# Patient Record
Sex: MALE | Race: BLACK OR AFRICAN AMERICAN | NOT HISPANIC OR LATINO | Employment: OTHER | URBAN - METROPOLITAN AREA
[De-identification: names, ages, dates, MRNs, and addresses within clinical notes are randomized per-mention and may not be internally consistent; named-entity substitution may affect disease eponyms.]

---

## 2020-10-29 ENCOUNTER — HOSPITAL ENCOUNTER (OUTPATIENT)
Dept: SLEEP MEDICINE | Facility: HOSPITAL | Age: 35
Discharge: HOME | End: 2020-10-29
Attending: INTERNAL MEDICINE

## 2020-11-17 ENCOUNTER — HOSPITAL ENCOUNTER (OUTPATIENT)
Dept: SLEEP MEDICINE | Facility: HOSPITAL | Age: 35
Discharge: HOME | End: 2020-11-17
Attending: INTERNAL MEDICINE

## 2020-11-22 ENCOUNTER — HOSPITAL ENCOUNTER (OUTPATIENT)
Dept: MRI IMAGING | Facility: HOSPITAL | Age: 35
Discharge: HOME | End: 2020-11-22
Attending: FAMILY MEDICINE

## 2021-01-14 ENCOUNTER — HOSPITAL ENCOUNTER (OUTPATIENT)
Dept: SLEEP MEDICINE | Facility: HOSPITAL | Age: 36
Discharge: HOME | End: 2021-01-14
Attending: INTERNAL MEDICINE

## 2021-04-08 ENCOUNTER — HOSPITAL ENCOUNTER (OUTPATIENT)
Dept: SLEEP MEDICINE | Facility: HOSPITAL | Age: 36
Discharge: HOME | End: 2021-04-08
Attending: INTERNAL MEDICINE

## 2021-12-15 ENCOUNTER — APPOINTMENT (OUTPATIENT)
Dept: GENERAL RADIOLOGY | Facility: HOSPITAL | Age: 36
End: 2021-12-15

## 2021-12-15 ENCOUNTER — HOSPITAL ENCOUNTER (EMERGENCY)
Facility: HOSPITAL | Age: 36
Discharge: HOME | End: 2021-12-15
Attending: EMERGENCY MEDICINE | Admitting: EMERGENCY MEDICINE

## 2021-12-15 VITALS
BODY MASS INDEX: 26.54 KG/M2 | SYSTOLIC BLOOD PRESSURE: 129 MMHG | WEIGHT: 185.41 LBS | HEART RATE: 81 BPM | HEIGHT: 70 IN | RESPIRATION RATE: 18 BRPM | DIASTOLIC BLOOD PRESSURE: 90 MMHG | TEMPERATURE: 98.6 F | OXYGEN SATURATION: 100 %

## 2021-12-15 DIAGNOSIS — S83.92XA SPRAIN OF LEFT KNEE, UNSPECIFIED LIGAMENT, INITIAL ENCOUNTER: Primary | ICD-10-CM

## 2021-12-15 PROCEDURE — 99282 EMERGENCY DEPT VISIT SF MDM: CPT

## 2021-12-15 PROCEDURE — 73562 X-RAY EXAM OF KNEE 3: CPT

## 2021-12-15 NOTE — DISCHARGE INSTRUCTIONS
Please call Dr. Sage's office today or tomorrow and schedule follow-up appointment if needed or follow-up with your primary care provider for a referral for a MRI.  Turn to the ER immediately if you develop worsening of pain, numbness and tingling to extremity, or become unable to ambulate.  Please adhere to the RICE acronym for your pain and discomfort.

## 2021-12-15 NOTE — ED PROVIDER NOTES
Subjective   Patient is a 36-year-old male that presents to emergency department today complaining of left knee pain.  Patient states his knee started bothering him approximately 1 year ago and he has just been putting it off.  States he has no pain when he runs but when he walks he develops a pain on the outer side of his left knee.  He states it feels like his knee shifts inward.  When he sits down to rest his pain improves his pain at this current time is a 3 on a scale of 0-10.  No obvious deformity, no uncontrolled external hemorrhage, and no discoloration.  MS intact and cap refill within normal limits      History provided by:  Patient   used: No        Review of Systems   Constitutional: Negative for chills and fever.   HENT: Negative for congestion, ear pain and sore throat.    Eyes: Negative for pain.   Respiratory: Negative for cough, chest tightness and shortness of breath.    Cardiovascular: Negative for chest pain.   Gastrointestinal: Negative for abdominal pain, diarrhea, nausea and vomiting.   Genitourinary: Negative for flank pain and hematuria.   Musculoskeletal: Negative for joint swelling.        Left knee pain   Skin: Negative for pallor.   Neurological: Negative for seizures and headaches.   All other systems reviewed and are negative.      History reviewed. No pertinent past medical history.    No Known Allergies    Past Surgical History:   Procedure Laterality Date   • FACIAL RECONSTRUCTION SURGERY         History reviewed. No pertinent family history.    Social History     Socioeconomic History   • Marital status:    Tobacco Use   • Smoking status: Current Every Day Smoker   • Smokeless tobacco: Never Used   Substance and Sexual Activity   • Alcohol use: Yes     Comment: Social   • Drug use: Never           Objective   Physical Exam  Vitals and nursing note reviewed.   Constitutional:       General: He is not in acute distress.     Appearance: Normal appearance. He  is not toxic-appearing.   HENT:      Head: Normocephalic and atraumatic.      Mouth/Throat:      Mouth: Mucous membranes are moist.   Eyes:      General: No scleral icterus.  Cardiovascular:      Rate and Rhythm: Normal rate and regular rhythm.      Pulses: Normal pulses.      Heart sounds: Normal heart sounds.   Pulmonary:      Effort: Pulmonary effort is normal. No respiratory distress.      Breath sounds: Normal breath sounds.   Abdominal:      General: Abdomen is flat.      Palpations: Abdomen is soft.      Tenderness: There is no abdominal tenderness.   Musculoskeletal:         General: Tenderness present. Normal range of motion.      Cervical back: Normal range of motion and neck supple.      Comments: Left knee   Skin:     General: Skin is warm and dry.   Neurological:      Mental Status: He is alert and oriented to person, place, and time. Mental status is at baseline.         Procedures           ED Course                                                 MDM  Number of Diagnoses or Management Options  Sprain of left knee, unspecified ligament, initial encounter: minor  Diagnosis management comments: 36-year-old male presents the emergency department with left knee pain that has been intermittent for approximately 1 year.  Patient states pain only occurs when he is walking.  Running does not affect his knee.  He has no obvious injury and reports no obvious injury.  X-ray was completed to rule out any fracture, dislocation, or emergent situation that might require immediate surgical intervention or splinting.  X-ray was benign PMS and cap refill within normal limits to that extremity.  Patient was provided referral to orthopedic surgeon for follow-up and possible MRI to rule out any ligament and tendon injury.       Amount and/or Complexity of Data Reviewed  Tests in the radiology section of CPT®: reviewed  Review and summarize past medical records: yes (I have personally reviewed patient's previous medical  encounters.  )    Risk of Complications, Morbidity, and/or Mortality  Presenting problems: low  Diagnostic procedures: low  Management options: low    Patient Progress  Patient progress: stable      Final diagnoses:   Sprain of left knee, unspecified ligament, initial encounter       ED Disposition  ED Disposition     ED Disposition Condition Comment    Discharge Stable           Harleen Sage MD  [address]   [place] [address]  [phone]    In 1 week  As needed, If symptoms worsen         Medication List      New Prescriptions    diclofenac 50 MG EC tablet  Commonly known as: VOLTAREN  Take 1 tablet by mouth 3 (Three) Times a Day.           Where to Get Your Medications      These medications were sent to [facility] -  [place] - [address] - [phone]  - [phone] FX  [address]  [place] [address]    Phone: [phone]   · diclofenac 50 MG EC tablet          Harper Martinez, APRN  12/15/21 1941